# Patient Record
Sex: FEMALE | Race: OTHER | HISPANIC OR LATINO | URBAN - METROPOLITAN AREA
[De-identification: names, ages, dates, MRNs, and addresses within clinical notes are randomized per-mention and may not be internally consistent; named-entity substitution may affect disease eponyms.]

---

## 2019-05-22 ENCOUNTER — EMERGENCY (EMERGENCY)
Facility: HOSPITAL | Age: 29
LOS: 1 days | Discharge: HOME | End: 2019-05-22
Attending: EMERGENCY MEDICINE
Payer: MEDICAID

## 2019-05-22 VITALS
DIASTOLIC BLOOD PRESSURE: 48 MMHG | SYSTOLIC BLOOD PRESSURE: 78 MMHG | TEMPERATURE: 98 F | RESPIRATION RATE: 18 BRPM | HEART RATE: 86 BPM

## 2019-05-22 LAB
ALBUMIN SERPL ELPH-MCNC: 3.6 G/DL — SIGNIFICANT CHANGE UP (ref 3.5–5)
ALP SERPL-CCNC: 69 U/L — SIGNIFICANT CHANGE UP (ref 40–120)
ALT FLD-CCNC: 23 U/L DA — SIGNIFICANT CHANGE UP (ref 10–60)
ANION GAP SERPL CALC-SCNC: 11 MMOL/L — SIGNIFICANT CHANGE UP (ref 5–17)
AST SERPL-CCNC: 20 U/L — SIGNIFICANT CHANGE UP (ref 10–40)
BASOPHILS # BLD AUTO: 0.01 K/UL — SIGNIFICANT CHANGE UP (ref 0–0.2)
BASOPHILS NFR BLD AUTO: 0.2 % — SIGNIFICANT CHANGE UP (ref 0–2)
BILIRUB SERPL-MCNC: 0.4 MG/DL — SIGNIFICANT CHANGE UP (ref 0.2–1.2)
BUN SERPL-MCNC: 18 MG/DL — SIGNIFICANT CHANGE UP (ref 7–18)
CALCIUM SERPL-MCNC: 8.4 MG/DL — SIGNIFICANT CHANGE UP (ref 8.4–10.5)
CHLORIDE SERPL-SCNC: 107 MMOL/L — SIGNIFICANT CHANGE UP (ref 96–108)
CO2 SERPL-SCNC: 21 MMOL/L — LOW (ref 22–31)
CREAT SERPL-MCNC: 0.93 MG/DL — SIGNIFICANT CHANGE UP (ref 0.5–1.3)
EOSINOPHIL # BLD AUTO: 0.01 K/UL — SIGNIFICANT CHANGE UP (ref 0–0.5)
EOSINOPHIL NFR BLD AUTO: 0.2 % — SIGNIFICANT CHANGE UP (ref 0–6)
GLUCOSE SERPL-MCNC: 145 MG/DL — HIGH (ref 70–99)
HCT VFR BLD CALC: 42.9 % — SIGNIFICANT CHANGE UP (ref 34.5–45)
HGB BLD-MCNC: 14 G/DL — SIGNIFICANT CHANGE UP (ref 11.5–15.5)
IMM GRANULOCYTES NFR BLD AUTO: 0.2 % — SIGNIFICANT CHANGE UP (ref 0–1.5)
LYMPHOCYTES # BLD AUTO: 4.29 K/UL — HIGH (ref 1–3.3)
LYMPHOCYTES # BLD AUTO: 71 % — HIGH (ref 13–44)
MCHC RBC-ENTMCNC: 30.8 PG — SIGNIFICANT CHANGE UP (ref 27–34)
MCHC RBC-ENTMCNC: 32.6 GM/DL — SIGNIFICANT CHANGE UP (ref 32–36)
MCV RBC AUTO: 94.3 FL — SIGNIFICANT CHANGE UP (ref 80–100)
MONOCYTES # BLD AUTO: 0.13 K/UL — SIGNIFICANT CHANGE UP (ref 0–0.9)
MONOCYTES NFR BLD AUTO: 2.2 % — SIGNIFICANT CHANGE UP (ref 2–14)
NEUTROPHILS # BLD AUTO: 1.59 K/UL — LOW (ref 1.8–7.4)
NEUTROPHILS NFR BLD AUTO: 26.2 % — LOW (ref 43–77)
NRBC # BLD: 0 /100 WBCS — SIGNIFICANT CHANGE UP (ref 0–0)
PLATELET # BLD AUTO: 245 K/UL — SIGNIFICANT CHANGE UP (ref 150–400)
POTASSIUM SERPL-MCNC: 3.4 MMOL/L — LOW (ref 3.5–5.3)
POTASSIUM SERPL-SCNC: 3.4 MMOL/L — LOW (ref 3.5–5.3)
PROT SERPL-MCNC: 7.5 G/DL — SIGNIFICANT CHANGE UP (ref 6–8.3)
RBC # BLD: 4.55 M/UL — SIGNIFICANT CHANGE UP (ref 3.8–5.2)
RBC # FLD: 13.1 % — SIGNIFICANT CHANGE UP (ref 10.3–14.5)
SODIUM SERPL-SCNC: 139 MMOL/L — SIGNIFICANT CHANGE UP (ref 135–145)
WBC # BLD: 6.04 K/UL — SIGNIFICANT CHANGE UP (ref 3.8–10.5)
WBC # FLD AUTO: 6.04 K/UL — SIGNIFICANT CHANGE UP (ref 3.8–10.5)

## 2019-05-22 PROCEDURE — 93005 ELECTROCARDIOGRAM TRACING: CPT

## 2019-05-22 PROCEDURE — 82962 GLUCOSE BLOOD TEST: CPT

## 2019-05-22 PROCEDURE — 80053 COMPREHEN METABOLIC PANEL: CPT

## 2019-05-22 PROCEDURE — 99284 EMERGENCY DEPT VISIT MOD MDM: CPT | Mod: 25

## 2019-05-22 PROCEDURE — 36415 COLL VENOUS BLD VENIPUNCTURE: CPT

## 2019-05-22 PROCEDURE — 96372 THER/PROPH/DIAG INJ SC/IM: CPT | Mod: XU

## 2019-05-22 PROCEDURE — 71045 X-RAY EXAM CHEST 1 VIEW: CPT | Mod: 26

## 2019-05-22 PROCEDURE — 96374 THER/PROPH/DIAG INJ IV PUSH: CPT

## 2019-05-22 PROCEDURE — 84702 CHORIONIC GONADOTROPIN TEST: CPT

## 2019-05-22 PROCEDURE — 85027 COMPLETE CBC AUTOMATED: CPT

## 2019-05-22 PROCEDURE — 96375 TX/PRO/DX INJ NEW DRUG ADDON: CPT

## 2019-05-22 PROCEDURE — 71045 X-RAY EXAM CHEST 1 VIEW: CPT

## 2019-05-22 PROCEDURE — 99285 EMERGENCY DEPT VISIT HI MDM: CPT | Mod: 25

## 2019-05-22 RX ORDER — SODIUM CHLORIDE 9 MG/ML
1000 INJECTION INTRAMUSCULAR; INTRAVENOUS; SUBCUTANEOUS ONCE
Refills: 0 | Status: COMPLETED | OUTPATIENT
Start: 2019-05-22 | End: 2019-05-22

## 2019-05-22 RX ORDER — FAMOTIDINE 10 MG/ML
20 INJECTION INTRAVENOUS ONCE
Refills: 0 | Status: COMPLETED | OUTPATIENT
Start: 2019-05-22 | End: 2019-05-22

## 2019-05-22 RX ORDER — DIPHENHYDRAMINE HCL 50 MG
50 CAPSULE ORAL ONCE
Refills: 0 | Status: COMPLETED | OUTPATIENT
Start: 2019-05-22 | End: 2019-05-22

## 2019-05-22 RX ORDER — EPINEPHRINE 0.3 MG/.3ML
0.3 INJECTION INTRAMUSCULAR; SUBCUTANEOUS ONCE
Refills: 0 | Status: COMPLETED | OUTPATIENT
Start: 2019-05-22 | End: 2019-05-22

## 2019-05-22 RX ADMIN — Medication 125 MILLIGRAM(S): at 22:17

## 2019-05-22 RX ADMIN — SODIUM CHLORIDE 2000 MILLILITER(S): 9 INJECTION INTRAMUSCULAR; INTRAVENOUS; SUBCUTANEOUS at 22:18

## 2019-05-22 RX ADMIN — FAMOTIDINE 20 MILLIGRAM(S): 10 INJECTION INTRAVENOUS at 22:17

## 2019-05-22 RX ADMIN — EPINEPHRINE 0.3 MILLIGRAM(S): 0.3 INJECTION INTRAMUSCULAR; SUBCUTANEOUS at 22:58

## 2019-05-22 RX ADMIN — Medication 50 MILLIGRAM(S): at 22:17

## 2019-05-22 NOTE — ED PROVIDER NOTE - CARE PROVIDER_API CALL
Madelyn Barroso (DO)  Internal Medicine  [address] [place] [id number]  Phone: [phone]  Fax: [phone]  Follow Up Time:

## 2019-05-22 NOTE — ED PROVIDER NOTE - OBJECTIVE STATEMENT
28 y/o F with no significant PMHx and no significant PSHX presents to the ED with c/o allergic rxn, including lip swelling and diffuse hives to the extremities x today. Pt states she is unaware what caused the sxs, but thinks it may be related to sesame seeds on bread she ate x PTA. Pt was hypertensive on arrival. Pt denies tongue swelling, oropharyngeal swelling, or any other complaints. NKDA. 30 y/o F with no significant PMHx and no significant PSHX presents to the ED with c/o allergic rxn, including lip swelling and diffuse hives to the extremities x today. Pt states she is unaware what caused the sxs, but thinks it may be related to sesame seeds on bread she ate x PTA. Pt was hypotensive on arrival. Pt denies tongue swelling, oropharyngeal swelling, or any other complaints. NKDA.

## 2019-05-22 NOTE — ED PROVIDER NOTE - CLINICAL SUMMARY MEDICAL DECISION MAKING FREE TEXT BOX
30 y/o F presents to the ED with allergic rxn and HTN on arrival x today. Given HTN and diffuse hives, will administer EpiPen, give allergy cocktail, and reassess. 28 y/o F presents to the ED in anaphylaxis w  hypotension and diffuse hives, given epi pen and allergy cocktail w good relief, observedn >4 hours w improvement, no resp distress, no OP swelling, abd non tender prior to dc, rx meds- steroids,a ntihistamines for next three days.

## 2019-05-22 NOTE — ED ADULT NURSE NOTE - NS ED NURSE RECORD ANOTHER VITAL SIGN
Ears: no ear pain and no hearing problems.Nose: no nasal congestion and no nasal drainage.Mouth/Throat: no dysphagia, no hoarseness and no throat pain.Neck: no lumps, no pain, no stiffness and no swollen glands.
Yes

## 2019-05-22 NOTE — ED ADULT TRIAGE NOTE - CHIEF COMPLAINT QUOTE
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

## 2019-05-22 NOTE — ED PROVIDER NOTE - NSFOLLOWUPINSTRUCTIONS_ED_ALL_ED_FT
Alergias en los adultos  Allergies, Adult  Introducción  Jaqueline alergia se produce cuando el sistema de defensa del cuerpo (sistema inmunitario) reacciona en exceso a jaqueline sustancia normalmente inofensiva (alérgeno), que se respira o se come, o entra en contacto con la piel. Al estar en contacto con algo a lo que se es alérgico, el sistema inmunitario produce ciertas proteínas (anticuerpos). Estas proteínas hacen que las células liberen sustancias químicas (histaminas) que desencadenan los síntomas de jaqueline reacción alérgica.    Las alergias suelen afectar las fosas nasales (rinitis alérgica), los ojos (conjuntivitis alérgica), la piel (dermatitis atópica) y el estómago. Las alergias pueden ser leves o graves. No se pueden diseminar de jaqueline persona a otra (no es contagiosa). Pueden aparecer a cualquier edad y se pueden superar con los años.    ¿Qué incrementa el riesgo?  Puede tener un riesgo mayor de sufrir alergias si otras personas de gill loyda tienen alergias.    ¿Cuáles son los signos o los síntomas?  Los síntomas dependen del tipo de alergia que tenga. Estos pueden incluir los siguientes:  Secreción o congestión nasal.  Estornudos.  Picazón en la boca, los oídos o la garganta.  Goteo posnasal.  Dolor de garganta.  Ojos rojos, lagrimosos, hinchados o con picazón.  Erupción o ronchas en la piel.  Dolor de estómago.  Vómitos.  Diarrea.  Meteorismo.  Tos o sibilancias.  Las personas con jaqueline alergia grave a los alimentos, los medicamentos o la picadura de insectos pueden tener jaqueline reacción alérgica potencialmente mortal (anafilaxia). Los síntomas de la anafilaxia incluyen:  Ronchas.  Picazón.  Uriel enrojecida.  Labios, lengua o boca hinchados.  Hinchazón u opresión en la garganta.  Dolor u opresión en el pecho.  Dificultad para respirar o falta de aire.  Latidos cardíacos rápidos.  Mareos o desmayos.  Vómitos.  Diarrea.  Dolor en el abdomen.  ¿Cómo se diagnostica?  Esta afección se diagnostica en función de lo siguiente:  Marla síntomas.  Marla antecedentes médicos y familiares.  Un examen físico.  Es posible que necesite shea a un médico especialista en el tratamiento de alergias (alergista). También pueden hacerle exámenes, que incluyen los siguientes:  Pruebas de piel para detectar qué alérgenos causan los síntomas. por ejemplo:  Prueba de punción. En esta prueba, se pincha la piel con jaqueline aguja diminuta para exponerla a pequeñas cantidades de posibles alérgenos y shea si la piel reacciona.  Prueba cutánea intradérmica. En esta prueba, se inyecta jaqueline pequeña cantidad de alérgeno debajo de la piel para shea si esta reacciona.  Prueba de parche. En esta prueba, se coloca jaqueline pequeña cantidad de alérgeno en la piel y luego se cubre con jaqueline venda. El médico examinará la piel después de un par de días para shea si hay jaqueline erupción cutánea.  Análisis de arsenio.  Pruebas de provocación. En esta prueba, debe inhalar jaqueline pequeña cantidad de alérgeno por boca para shea si produce jaqueline reacción alérgica.  También pueden pedirle que:  Lleve un registro de alimentos. Un registro de alimentos incluye todos los alimentos y las bebidas que usted consume en un día, y cualquier síntoma que experimenta.  Practique jaqueline dieta de eliminación. Jaqueline dieta de eliminación implica eliminar alimentos específicos de la dieta y luego incorporarlos nuevamente, eliazar a la vez, para averiguar si hay eliazar en particular que le cause jaqueline reacción alérgica.  ¿Cómo se trata?  El tratamiento para las alergias depende de los síntomas. El tratamiento puede incluir lo siguiente:  Compresas frías para aliviar la picazón y la hinchazón.  Gotas oftálmicas.  Aerosoles nasales.  Usar un aerosol salino o lota nasal (neti pot) para dmeond la nariz (irrigación nasal). Estos métodos pueden ayudar a eliminar mucosidad y mantener las fosas nasales húmedas.  El uso de un humidificador.  Antihistamínicos orales u otros medicamentos para detener la reacción alérgica y la inflamación.  Cremas para la piel para tratar las erupciones o la picazón.  Cambios en la dieta para eliminar los desencadenantes de la alergia a los alimentos.  Exposición repetida a cantidades diminutas de alérgenos para generar tolerancia y prevenir reacciones alérgicas futuras (inmunoterapia). Estas incluyen los siguientes:  Vacunas contra la alergia.  Tratamiento por vía oral. [place] incluye pequeñas dosis de un alérgeno debajo de la lengua (inmunoterapia sublingual).  Inyección de epinefrina de emergencia (autoinyector) en rivas de jaqueline emergencia alérgica. Se trata de un medicamento autoinyectable y medido previamente que se debe administrar en los primeros minutos de jaqueline reacción alérgica grave.  Siga estas indicaciones en gill casa:  Image Image Image   Evite los alérgenos conocidos, siempre que sea posible.  Si sufre de alergia por alérgenos que están en el aire, lávese la nariz a diario. Puede usar un aerosol o jaqueline lota nasal (neti pot) para demond la nariz (irrigación nasal).  Herricks los medicamentos de venta justin y los recetados solamente soila se lo haya indicado el médico.  Concurra a todas las visitas de seguimiento soila se lo haya indicado el médico. [place] es importante.  Si tiene riesgo de sufrir jaqueline reacción alérgica grave (anafilaxia), lleve gill autoinyector con usted en todo momento.  Si alguna vez ha tenido anafilaxia, use jaqueline pulsera o collar de alerta médica que diga que usted tiene jaqueline alergia grave.  Comuníquese con un médico si:  Los síntomas no mejoran con el tratamiento.  Solicite ayuda de inmediato si:  Tiene síntomas de anafilaxia, soila los siguientes:  Boca, lengua o garganta hinchadas.  Dolor u opresión en el pecho.  Dificultad para respirar o falta de aire.  Mareos o desmayos.  Dolor abdominal intenso, vómitos o diarrea.  Esta información no tiene soila fin reemplazar el consejo del médico. Asegúrese de hacerle al médico cualquier pregunta que tenga.    Reacción anafiláctica, en adultos  Anaphylactic Reaction, Adult  Introducción  Jaqueline reacción anafiláctica (anafilaxia) es jaqueline reacción alérgica repentina y grave del sistema del cuerpo que grace contra las enfermedades (sistema inmunitario). La anafilaxia puede ser potencialmente mortal. Esta afección requiere atención médica inmediata y, algunas veces, hospitalización.    ¿Cuáles son las causas?  Esta afección es causada por la exposición a sustancias a las que usted es alérgico (alérgenos). En respuesta a esta exposición, el organismo libera proteínas (anticuerpos) y otros componentes, soila la histamina, en el torrente sanguíneo. [place] provoca la hinchazón de ciertos tejidos y la disminución de la presión arterial en zonas importantes, soila el corazón y los pulmones.    Los alérgenos más frecuentes que provocan la anafilaxia incluyen, entre otros:  Algunos alimentos, especialmente los cacahuetes, josh, mariscos, leche y huevos.  Medicamentos.  Las picaduras o mordeduras de insectos.  Arsenio o partes de la arsenio incluyendo plasma, inmunoglobulina o shweta.  Algunos productos químicos, tales soila tinturas, látex y la sustancia de contraste utilizada para algunas pruebas médicas.  ¿Qué incrementa el riesgo?  Es más probable que esta afección se manifieste en personas que:  Tienen alergias.  Bone tenido anafilaxia antes.  Tienen antecedentes familiares de anafilaxia.  Tienen ciertas afecciones médicas que incluyen asma y eczema.  ¿Cuáles son los signos o los síntomas?  Los síntomas de la anafilaxia pueden incluir:  Sensación de calor en la uriel (rubor). Puede presentarse acompañada de enrojecimiento.  Zonas de piel hinchadas, navarrete y que producen picazón (ronchas).  Hinchazón de los ojos, los labios, la uriel, la lengua, la boca o la garganta.  Dificultad para respirar, para hablar o para tragar.  Respiración ruidosa (sibilancias).  Mareos o sensación de desvanecimiento.  Desmayos.  Dolor o cólicos en el abdomen.  Vómitos.  Diarrea.  ¿Cómo se diagnostica?  Esta afección se diagnostica mediante un examen físico y marla antecedentes de exposición reciente a alérgenos.    ¿Cómo se trata?  ImageEl médico puede enseñarle a hacer lo siguiente:  Cómo usar un kit de anafilaxia.  Cómo aplicarse jaqueline inyección de epinefrina con lo que se llama comúnmente “lápiz” autoinyector (dispositivo automático precargado para inyectar epinefrina).  Si piensa que tiene jaqueline reacción anafiláctica, use el lápiz autoinyector o un kit de anafilaxia. Si usa un kit o lápiz, igual debe recibir asistencia médica de emergencia.    El tratamiento en el hospital puede incluir lo siguiente:  Medicamentos para lo siguiente:  Contraer los vasos sanguíneos (epinefrina).  Aliviar la picazón y la urticaria (antihistamínicos).  Reducir la hinchazón (corticoesteroides).  Oxigenoterapia para ayudarlo a que respire.  Líquidos intravenosos (i.v.) para mantener la hidratación.  Siga estas indicaciones en gill casa:  Seguridad     Siempre tenga a mano un lápiz autoinyector o un kit de anafilaxia. [place] puede salvarle la jim si tiene jaqueline reacción anafiláctica grave. Use el lápiz autoinyector o kit de anafilaxia soila se lo haya indicado el médico.  No conduzca después de jaqueline reacción anafiláctica hasta que el médico lo autorice.  Asegúrese de que usted, las personas que viven en gill hogar y gill empleador sepan:  Cómo usar un kit de anafilaxia.  Cómo usar un lápiz autoinyector para aplicarle jaqueline inyección de epinefrina.  Reponga la epinefrina inmediatamente después de usar gill lápiz autoinyector, en rivas de volver a tener jaqueline reacción en el futuro.  Lleve un brazalete o collar de alerta médica que informe sobre gill alergia, si se lo indica el médico.  Aprenda los signos de anafilaxia para poder reconocerlos y tratarlos de inmediato.  Colabore con marla médicos para elaborar un plan de anafilaxia. Estar preparado es importante.  Instrucciones generales     Herricks los medicamentos de venta justin y los recetados solamente soila se lo haya indicado el médico.  Si tiene ronchas o jaqueline erupción cutánea:  Use un antihistamínico de venta justin soila se lo haya indicado el médico.  Aplíquese compresas mojadas con agua fría sobre la piel, o tome eusebio o duchas de agua fría. Evite el [place]  Informe a todos los médicos que lo atienden que usted tiene jaqueline alergia.  Concurra a todas las visitas de seguimiento soila se lo haya indicado el médico. [place] es importante.  ¿Cómo se anne marie?  Evite los alérgenos que le hayan causado jaqueline reacción anafiláctica anteriormente.  Cuando vaya a un restaurante, informe al camarero que tiene jaqueline alergia. Si no tiene la certeza de si un plato del menú contiene un ingrediente al cual tiene alergia, pregúntele al camarero.  Solicite ayuda de inmediato si:  Presenta síntomas de jaqueline reacción alérgica. Los podría notar poco después de estar expuesto a jaqueline determinada sustancia. Entre los síntomas se pueden incluir los siguientes:  Piel irritada.  Ronchas.  Hinchazón de los ojos, los labios, la uriel, la lengua, la boca o la garganta.  Dificultad para respirar, para hablar o para tragar.  Sibilancias.  Mareos o sensación de desvanecimiento.  Desmayos.  Dolor o cólicos en el abdomen.  Vómitos.  Diarrea.  Tuvo que usar epinefrina. Necesitará más atención médica incluso si el medicamento parece surtir efecto. Puede que necesite más dosis de epinefrina. [place] es importante ya que la anafilaxia puede suceder otra vez en el transcurso de 72 horas (anafilaxia de rebote).  Estos síntomas pueden representar un problema grave que constituye jaqueline emergencia. No espere hasta que los síntomas desaparezcan. Use el lápiz autoinyector o el kit de anafilaxia soila se lo hayan indicado y obtenga asistencia médica de inmediato. Comuníquese con el servicio de emergencias de gill localidad (911 en los [place]). No conduzca por marla propios medios hasta el hospital.     Resumen  Jaqueline reacción anafiláctica (anafilaxia) es jaqueline reacción alérgica repentina y grave del sistema del cuerpo que grace contra las enfermedades (sistema inmunitario).  Esta afección puede ser potencialmente mortal. Si usted tiene jaqueline reacción anafiláctica, obtenga asistencia médica inmediata.  El médico puede enseñarle cómo usar un kit de anafilaxia y cómo usar un "lápiz" autoinyector (dispositivo automático precargado para inyectar epinefrina) para aplicarse jaqueline inyección.  Siempre tenga a mano gill lápiz autoinyector o kit de anafilaxia. [place] puede salvar gill jim. Úselos soila se lo haya indicado el médico.  Si se administra epinefrina, igual debe recibir asistencia médica de emergencia, incluso si el medicamento parece surtir efecto.  Esta información no tiene soila fin reemplazar el consejo del médico. Asegúrese de hacerle al médico cualquier pregunta que tenga.

## 2019-05-23 VITALS
HEART RATE: 71 BPM | SYSTOLIC BLOOD PRESSURE: 103 MMHG | TEMPERATURE: 98 F | DIASTOLIC BLOOD PRESSURE: 64 MMHG | OXYGEN SATURATION: 98 % | RESPIRATION RATE: 16 BRPM

## 2019-05-23 RX ORDER — FAMOTIDINE 10 MG/ML
1 INJECTION INTRAVENOUS
Qty: 3 | Refills: 0
Start: 2019-05-23 | End: 2019-05-25

## 2019-05-23 RX ORDER — EPINEPHRINE 0.3 MG/.3ML
0.3 INJECTION INTRAMUSCULAR; SUBCUTANEOUS
Qty: 2 | Refills: 0
Start: 2019-05-23